# Patient Record
(demographics unavailable — no encounter records)

---

## 2024-12-09 NOTE — DISCUSSION/SUMMARY
[FreeTextEntry1] : 70-year-old male with long-term persistent A-fib atrial flutter overall rate controlled he does have some episodes of RVR during the day and feels symptomatic he also has pauses at night related to sleep apnea cannot tolerate CPAP he will inquire about dental appliance for CHIARA treatment patient is status post PATTI C watchman implant will obtain MADINA 6 weeks post implant, cont hd eliquis and [id number] -He benefits from better rate control cannot give high-dose of medication due to nocturnal bradycardia would start Cardizem 120 mg daily stop amlodipine check BP frequently at home  Patient expressed understanding of the discussion and recommendations. I spent a total of 45 minutes on the encounter evaluating and discussing treatment options with the patient, as well as counseling and coordination of care as stated above.

## 2024-12-09 NOTE — HISTORY OF PRESENT ILLNESS
[FreeTextEntry1] : 70-year-old male with history of prostate CAD prior surgery who has long-term persistent A-fib/atrial flutter and prior AF ablation x 2 who is well-controlled ventricular rates.  He had nocturnal pauses up to 6 seconds no longer taking metoprolol.  He had diverticulosis GI bleed in the past and underwent PATTI occlusion with watchman flex 31 mm device on 11/21/2024 and there was challenging PATTI anatomy, final watchman deployment had less than 2 mm leak and at 135 degree image only all other views look good. Patient has been on half dose Eliquis and ASA 81 mg daily. He feels mild rios at baseline and mild fatigue. Sometimes hr at home >100 bpm and he takes metoprolol prn. Denies cp, dizziness, syncope.

## 2024-12-09 NOTE — REVIEW OF SYSTEMS
[Feeling Fatigued] : feeling fatigued [Dyspnea on exertion] : dyspnea during exertion [Negative] : Neurological

## 2025-01-08 NOTE — HISTORY OF PRESENT ILLNESS
[FreeTextEntry1] : 69 yo male presents today for a follow-up appointment for PSA surveillance s/p RALP and robotic diverticulectomy on 8/13/24.  Path: GG3, GS7 (4+3) negative nodes, negative margins.  bladder diverticulum negative for malignancy x2  Recent PSA: <0.01 (12.31.24) Previous PSA <0.01 (9.19.24)  S/p Watchmans procedure 12/2024 for afib  Patient is doing well overall. Denies any urinary complaints. Poor erections- not currently on meds

## 2025-01-08 NOTE — ADDENDUM
[FreeTextEntry1] : I, Julia Reilly assisted in documentation on 01/08/2025 at acting as a scribe for and in the presence of Dr. Harry Connell.

## 2025-01-08 NOTE — ASSESSMENT
[FreeTextEntry1] : Discussed PSA results with patient- undetectable. Repeat PSA in 3 months. Rx Cialis 20mg PRN sent to pharmacy for ED Next follow-up appointment in 3 months. All questions answered patient agreeable with plan. 20-minute discussion for prostate cancer follow-up and review of labs.

## 2025-04-02 NOTE — ASSESSMENT
[FreeTextEntry1] : Discussed PSA results with patient- undetectable. Cialis 20mg sent to pharmacy for ED Repeat PSA in 3 months. Next follow-up appointment in 3 months. All questions answered patient agreeable with plan.   20-minutes for prostate cancer follow-up. Time spent is for reviewing chart, labs and images (if available), counseling and care coordination.

## 2025-04-02 NOTE — ADDENDUM
[FreeTextEntry1] : I, Julia Reilly assisted in documentation on 04/02/2025 at acting as a scribe for and in the presence of Dr. Harry Connell.

## 2025-04-02 NOTE — HISTORY OF PRESENT ILLNESS
[FreeTextEntry1] : 71 yo male with history of prostate cancer presents today for a follow-up appointment. S/p RALP and robotic diverticulectomy on 8/13/24.  Path: GG3, GS7 (4+3) negative nodes, negative margins. Bladder diverticulum- negative for malignancy x2  Recent PSA: <0.014 (3.5.25) previously <0.01 on (12.31.24)  S/p Watchmans procedure 12/2024 for afib  Reports good urinary control.

## 2025-04-02 NOTE — HISTORY OF PRESENT ILLNESS
[FreeTextEntry1] : 69 yo male with history of prostate cancer presents today for a follow-up appointment. S/p RALP and robotic diverticulectomy on 8/13/24.  Path: GG3, GS7 (4+3) negative nodes, negative margins. Bladder diverticulum- negative for malignancy x2  Recent PSA: <0.014 (3.5.25) previously <0.01 on (12.31.24)  S/p Watchmans procedure 12/2024 for afib  Reports good urinary control.

## 2025-07-09 NOTE — HISTORY OF PRESENT ILLNESS
[FreeTextEntry1] : 72 y/o male with history of prostate cancer presents today for a follow-up appointment.  S/p RALP and robotic diverticulectomy on 8/13/24. Path: GG3, GS7 (4+3) negative nodes, negative margins.  Bladder diverticulum x2- negative for malignancy  Recent PSA: <0.01 (6.30.25) Previous PSA: <0.014 (3.5.25) (outside lab)  S/p Watchmans procedure 12/2024 for Afib  Reports good urinary control. Great stream. Erections - pt did not trial Tadalafil 20mg sent previously.

## 2025-07-09 NOTE — ASSESSMENT
[FreeTextEntry1] : Discussed PSA results with patient- remains undetectable. Pt to trial 20mg Tadalafil PRN for erections - goodrx discount coupon given to pt today to obtain medication. Repeat PSA in 4 months. Next follow-up appointment in 4 months. All questions answered and patient agreeable with plan. discussion for complex longitudinal followup prostate cancer follow-up and review of labs. Time spent is for reviewing chart, labs and images (if available), counseling and care coordination.

## 2025-07-09 NOTE — ADDENDUM
[FreeTextEntry1] : Annabel PALUMBO NP, assisted with documentation on 07/03/2025 in the presence and under the direction of Dr. Harry Connell.